# Patient Record
Sex: FEMALE | Race: WHITE | NOT HISPANIC OR LATINO
[De-identification: names, ages, dates, MRNs, and addresses within clinical notes are randomized per-mention and may not be internally consistent; named-entity substitution may affect disease eponyms.]

---

## 2017-02-13 ENCOUNTER — RESULT REVIEW (OUTPATIENT)
Age: 22
End: 2017-02-13

## 2017-02-14 ENCOUNTER — APPOINTMENT (OUTPATIENT)
Dept: SURGERY | Facility: CLINIC | Age: 22
End: 2017-02-14

## 2017-02-14 ENCOUNTER — LABORATORY RESULT (OUTPATIENT)
Age: 22
End: 2017-02-14

## 2017-02-14 VITALS
HEIGHT: 65 IN | BODY MASS INDEX: 46.32 KG/M2 | WEIGHT: 278 LBS | SYSTOLIC BLOOD PRESSURE: 144 MMHG | DIASTOLIC BLOOD PRESSURE: 86 MMHG | HEART RATE: 125 BPM

## 2017-02-17 ENCOUNTER — OTHER (OUTPATIENT)
Age: 22
End: 2017-02-17

## 2017-03-09 ENCOUNTER — FORM ENCOUNTER (OUTPATIENT)
Age: 22
End: 2017-03-09

## 2017-03-09 ENCOUNTER — RESULT REVIEW (OUTPATIENT)
Age: 22
End: 2017-03-09

## 2017-03-10 ENCOUNTER — OUTPATIENT (OUTPATIENT)
Dept: OUTPATIENT SERVICES | Facility: HOSPITAL | Age: 22
LOS: 1 days | End: 2017-03-10
Payer: COMMERCIAL

## 2017-03-10 ENCOUNTER — APPOINTMENT (OUTPATIENT)
Dept: ULTRASOUND IMAGING | Facility: IMAGING CENTER | Age: 22
End: 2017-03-10

## 2017-03-10 DIAGNOSIS — Z00.8 ENCOUNTER FOR OTHER GENERAL EXAMINATION: ICD-10-CM

## 2017-03-10 PROCEDURE — 88173 CYTOPATH EVAL FNA REPORT: CPT

## 2017-03-10 PROCEDURE — 76942 ECHO GUIDE FOR BIOPSY: CPT

## 2017-03-10 PROCEDURE — 88172 CYTP DX EVAL FNA 1ST EA SITE: CPT

## 2017-03-10 PROCEDURE — 10022: CPT

## 2017-03-13 LAB — NON-GYNECOLOGICAL CYTOLOGY STUDY: SIGNIFICANT CHANGE UP

## 2017-03-16 ENCOUNTER — OTHER (OUTPATIENT)
Age: 22
End: 2017-03-16

## 2017-04-04 ENCOUNTER — OTHER (OUTPATIENT)
Age: 22
End: 2017-04-04

## 2017-04-20 ENCOUNTER — OUTPATIENT (OUTPATIENT)
Dept: OUTPATIENT SERVICES | Facility: HOSPITAL | Age: 22
LOS: 1 days | End: 2017-04-20

## 2017-04-20 ENCOUNTER — APPOINTMENT (OUTPATIENT)
Dept: SURGERY | Facility: CLINIC | Age: 22
End: 2017-04-20

## 2017-04-20 VITALS
TEMPERATURE: 97 F | SYSTOLIC BLOOD PRESSURE: 140 MMHG | WEIGHT: 285.94 LBS | HEIGHT: 65 IN | RESPIRATION RATE: 14 BRPM | DIASTOLIC BLOOD PRESSURE: 76 MMHG | HEART RATE: 98 BPM

## 2017-04-20 DIAGNOSIS — Z01.818 ENCOUNTER FOR OTHER PREPROCEDURAL EXAMINATION: ICD-10-CM

## 2017-04-20 DIAGNOSIS — R20.0 ANESTHESIA OF SKIN: ICD-10-CM

## 2017-04-20 DIAGNOSIS — E04.1 NONTOXIC SINGLE THYROID NODULE: ICD-10-CM

## 2017-04-20 DIAGNOSIS — E66.01 MORBID (SEVERE) OBESITY DUE TO EXCESS CALORIES: ICD-10-CM

## 2017-04-20 LAB
HCT VFR BLD CALC: 44 % — SIGNIFICANT CHANGE UP (ref 34.5–45)
HGB BLD-MCNC: 14.2 G/DL — SIGNIFICANT CHANGE UP (ref 11.5–15.5)
MCHC RBC-ENTMCNC: 28.6 PG — SIGNIFICANT CHANGE UP (ref 27–34)
MCHC RBC-ENTMCNC: 32.3 % — SIGNIFICANT CHANGE UP (ref 32–36)
MCV RBC AUTO: 88.7 FL — SIGNIFICANT CHANGE UP (ref 80–100)
PLATELET # BLD AUTO: 279 K/UL — SIGNIFICANT CHANGE UP (ref 150–400)
PMV BLD: 10 FL — SIGNIFICANT CHANGE UP (ref 7–13)
RBC # BLD: 4.96 M/UL — SIGNIFICANT CHANGE UP (ref 3.8–5.2)
RBC # FLD: 13.3 % — SIGNIFICANT CHANGE UP (ref 10.3–14.5)
WBC # BLD: 9.7 K/UL — SIGNIFICANT CHANGE UP (ref 3.8–10.5)
WBC # FLD AUTO: 9.7 K/UL — SIGNIFICANT CHANGE UP (ref 3.8–10.5)

## 2017-04-20 RX ORDER — LEVOTHYROXINE SODIUM 125 MCG
1 TABLET ORAL
Qty: 0 | Refills: 0 | COMMUNITY

## 2017-04-20 RX ORDER — L.ACIDOPH/B.ANIMALIS/B.LONGUM 15B CELL
1 CAPSULE ORAL
Qty: 0 | Refills: 0 | COMMUNITY

## 2017-04-20 RX ORDER — LIOTHYRONINE SODIUM 25 UG/1
0 TABLET ORAL
Qty: 0 | Refills: 0 | COMMUNITY

## 2017-04-20 RX ORDER — CETIRIZINE HYDROCHLORIDE 10 MG/1
1 TABLET ORAL
Qty: 0 | Refills: 0 | COMMUNITY

## 2017-04-20 NOTE — H&P PST ADULT - RS GEN PE MLT RESP DETAILS PC
breath sounds equal/clear to auscultation bilaterally/good air movement/respirations non-labored/airway patent

## 2017-04-20 NOTE — H&P PST ADULT - NSANTHOSAYNRD_GEN_A_CORE
No. ELLIS screening performed.  STOP BANG Legend: 0-2 = LOW Risk; 3-4 = INTERMEDIATE Risk; 5-8 = HIGH Risk

## 2017-04-20 NOTE — H&P PST ADULT - NEGATIVE MUSCULOSKELETAL SYMPTOMS
no myalgia/no arthritis/no muscle cramps/no joint swelling/no neck pain/no muscle weakness/no back pain

## 2017-04-20 NOTE — H&P PST ADULT - ACTIVITY
walking, eliptical 15 min/daily, climbing stairs walking, elliptical 15 min/daily, climbs stairs without difficulty

## 2017-04-20 NOTE — H&P PST ADULT - HISTORY OF PRESENT ILLNESS
22 year old female with hx of hashimoto's thyroiditis, with recent sonogram revealing thyroid nodule. Pt had biopsy done which was suspicious...    Pt presents today for presurgical evalution for ... 22 year old female with hx of hashimoto's thyroiditis, with recent sonogram revealing thyroid nodule. Pt had biopsy done on 3/10/17 which was suspicious for malignancy.    Pt presents today for presurgical evaluation for Thyroidectomy scheduled on 5/1/17. 22 year old female with hx of hashimoto's thyroiditis, with recent sonogram revealing thyroid nodule. Pt had biopsy done on 3/10/17 which was suspicious for malignancy.    Pt presents today for presurgical evaluation for Total Thyroidectomy, Paratracheal Node Dissection scheduled on 5/1/17.

## 2017-04-20 NOTE — H&P PST ADULT - MUSCULOSKELETAL
details… detailed exam ROM intact/normal strength/no joint swelling/no joint erythema/no joint warmth/no calf tenderness

## 2017-04-20 NOTE — H&P PST ADULT - PROBLEM SELECTOR PLAN 1
Pt scheduled for ...  Pre-op instructions provided. Pt verbalized understanding.   Pepcid provided for GI prophylaxis.   Chlorhexidine wash provided and instructions given. Pt scheduled for Total Thyroidectomy, Paratracheal Node Dissection on 5/1/17.   Pre-op instructions provided. Pt verbalized understanding.   Pepcid provided for GI prophylaxis.   Chlorhexidine wash provided and instructions given.

## 2017-05-01 ENCOUNTER — RESULT REVIEW (OUTPATIENT)
Age: 22
End: 2017-05-01

## 2017-05-01 ENCOUNTER — TRANSCRIPTION ENCOUNTER (OUTPATIENT)
Age: 22
End: 2017-05-01

## 2017-05-01 ENCOUNTER — OTHER (OUTPATIENT)
Age: 22
End: 2017-05-01

## 2017-05-01 ENCOUNTER — APPOINTMENT (OUTPATIENT)
Dept: SURGERY | Facility: HOSPITAL | Age: 22
End: 2017-05-01

## 2017-05-01 ENCOUNTER — OUTPATIENT (OUTPATIENT)
Dept: OUTPATIENT SERVICES | Facility: HOSPITAL | Age: 22
LOS: 1 days | Discharge: HOME | End: 2017-05-01
Payer: COMMERCIAL

## 2017-05-01 VITALS
DIASTOLIC BLOOD PRESSURE: 77 MMHG | SYSTOLIC BLOOD PRESSURE: 162 MMHG | OXYGEN SATURATION: 94 % | HEART RATE: 107 BPM | RESPIRATION RATE: 18 BRPM

## 2017-05-01 VITALS
SYSTOLIC BLOOD PRESSURE: 148 MMHG | RESPIRATION RATE: 14 BRPM | HEART RATE: 106 BPM | DIASTOLIC BLOOD PRESSURE: 84 MMHG | WEIGHT: 285.06 LBS | OXYGEN SATURATION: 100 % | TEMPERATURE: 98 F | HEIGHT: 65 IN

## 2017-05-01 DIAGNOSIS — E04.1 NONTOXIC SINGLE THYROID NODULE: ICD-10-CM

## 2017-05-01 PROCEDURE — 88305 TISSUE EXAM BY PATHOLOGIST: CPT | Mod: 26

## 2017-05-01 PROCEDURE — 60252 REMOVAL OF THYROID: CPT

## 2017-05-01 PROCEDURE — 88307 TISSUE EXAM BY PATHOLOGIST: CPT | Mod: 26

## 2017-05-01 PROCEDURE — 13132 CMPLX RPR F/C/C/M/N/AX/G/H/F: CPT | Mod: 59

## 2017-05-01 RX ORDER — CALCIUM CARBONATE 500(1250)
1 TABLET ORAL
Qty: 0 | Refills: 0 | COMMUNITY
Start: 2017-05-01

## 2017-05-01 RX ORDER — SODIUM CHLORIDE 9 MG/ML
1000 INJECTION, SOLUTION INTRAVENOUS
Qty: 0 | Refills: 0 | Status: DISCONTINUED | OUTPATIENT
Start: 2017-05-01 | End: 2017-05-02

## 2017-05-01 RX ORDER — CALCIUM GLUCONATE 100 MG/ML
1 VIAL (ML) INTRAVENOUS ONCE
Qty: 0 | Refills: 0 | Status: COMPLETED | OUTPATIENT
Start: 2017-05-01 | End: 2017-05-01

## 2017-05-01 RX ORDER — ACETAMINOPHEN 500 MG
2 TABLET ORAL
Qty: 0 | Refills: 0 | COMMUNITY
Start: 2017-05-01

## 2017-05-01 RX ORDER — OXYCODONE HYDROCHLORIDE 5 MG/1
5 TABLET ORAL EVERY 4 HOURS
Qty: 0 | Refills: 0 | Status: DISCONTINUED | OUTPATIENT
Start: 2017-05-01 | End: 2017-05-02

## 2017-05-01 RX ORDER — SODIUM CHLORIDE 9 MG/ML
3 INJECTION INTRAMUSCULAR; INTRAVENOUS; SUBCUTANEOUS ONCE
Qty: 0 | Refills: 0 | Status: DISCONTINUED | OUTPATIENT
Start: 2017-05-01 | End: 2017-05-02

## 2017-05-01 RX ORDER — ONDANSETRON 8 MG/1
4 TABLET, FILM COATED ORAL ONCE
Qty: 0 | Refills: 0 | Status: DISCONTINUED | OUTPATIENT
Start: 2017-05-01 | End: 2017-05-02

## 2017-05-01 RX ORDER — FENTANYL CITRATE 50 UG/ML
50 INJECTION INTRAVENOUS
Qty: 0 | Refills: 0 | Status: DISCONTINUED | OUTPATIENT
Start: 2017-05-01 | End: 2017-05-02

## 2017-05-01 RX ORDER — OXYCODONE HYDROCHLORIDE 5 MG/1
10 TABLET ORAL EVERY 6 HOURS
Qty: 0 | Refills: 0 | Status: DISCONTINUED | OUTPATIENT
Start: 2017-05-01 | End: 2017-05-02

## 2017-05-01 RX ORDER — ACETAMINOPHEN 500 MG
650 TABLET ORAL EVERY 6 HOURS
Qty: 0 | Refills: 0 | Status: DISCONTINUED | OUTPATIENT
Start: 2017-05-01 | End: 2017-05-02

## 2017-05-01 RX ORDER — CALCIUM CARBONATE 500(1250)
1 TABLET ORAL
Qty: 0 | Refills: 0 | Status: DISCONTINUED | OUTPATIENT
Start: 2017-05-01 | End: 2017-05-02

## 2017-05-01 RX ORDER — ACETAMINOPHEN 500 MG
1000 TABLET ORAL ONCE
Qty: 0 | Refills: 0 | Status: COMPLETED | OUTPATIENT
Start: 2017-05-01 | End: 2017-05-01

## 2017-05-01 RX ORDER — ONDANSETRON 8 MG/1
8 TABLET, FILM COATED ORAL ONCE
Qty: 0 | Refills: 0 | Status: DISCONTINUED | OUTPATIENT
Start: 2017-05-01 | End: 2017-05-02

## 2017-05-01 RX ADMIN — Medication 1000 MILLIGRAM(S): at 14:25

## 2017-05-01 RX ADMIN — Medication 400 MILLIGRAM(S): at 14:10

## 2017-05-01 RX ADMIN — Medication 200 GRAM(S): at 09:30

## 2017-05-01 RX ADMIN — Medication 1 TABLET(S): at 12:00

## 2017-05-01 NOTE — ASU DISCHARGE PLAN (ADULT/PEDIATRIC). - NURSING INSTRUCTIONS
You were given 1000mg IV Tylenol for pain management.  Please DO NOT take any Tylenol containing products, such as  Vicodin, Percocet, Excedrin, many cold preparations for the next 8 hours (until 345p).  DO NOT EXCEED 3000MG OF TYLENOL OVER 24 HOURS.   Dr. Schaefer prefers that you avoid using NSAIDS.  Please DO Not take Motrin/Ibuprofen/Advil/Aleve (NSAIDS) You were given 1000mg IV Tylenol for pain management.  Please DO NOT take any Tylenol containing products, such as  Vicodin, Percocet, Excedrin, many cold preparations for the next 8 hours (until 10p).  DO NOT EXCEED 3000MG OF TYLENOL OVER 24 HOURS.   Dr. Schaefer prefers that you avoid using NSAIDS.  Please DO Not take Motrin/Ibuprofen/Advil/Aleve (NSAIDS)

## 2017-05-01 NOTE — ASU DISCHARGE PLAN (ADULT/PEDIATRIC). - MEDICATION SUMMARY - MEDICATIONS TO TAKE
I will START or STAY ON the medications listed below when I get home from the hospital:    acetaminophen 325 mg oral tablet  -- 2 tab(s) by mouth every 6 hours, As needed, Moderate Pain (4 - 6)  -- Indication: For Nontoxic uninodular goiter    calcium carbonate 500 mg (200 mg elemental calcium) oral tablet, chewable  -- 1 tab(s) by mouth 4 times a day  -- Indication: For Nontoxic uninodular goiter    ZyrTEC 10 mg oral tablet  -- 1 tab(s) by mouth once a day  -- Indication: For Nontoxic uninodular goiter    Probiotic Formula oral capsule  -- 1 cap(s) by mouth once a day  -- Indication: For Nontoxic uninodular goiter    Synthroid 112 mcg (0.112 mg) oral tablet  -- 1 tab(s) by mouth once a day (at bedtime)  -- Indication: For Nontoxic uninodular goiter    Cytomel 5 mcg oral tablet  --  by mouth once a day (at bedtime)  -- Indication: For Nontoxic uninodular goiter

## 2017-05-02 ENCOUNTER — APPOINTMENT (OUTPATIENT)
Dept: SURGERY | Facility: CLINIC | Age: 22
End: 2017-05-02

## 2017-05-02 RX ORDER — ONDANSETRON 4 MG/1
4 TABLET, ORALLY DISINTEGRATING ORAL
Qty: 30 | Refills: 0 | Status: COMPLETED | COMMUNITY
Start: 2016-08-26

## 2017-05-02 RX ORDER — LIOTHYRONINE SODIUM 5 UG/1
5 TABLET ORAL
Qty: 30 | Refills: 0 | Status: COMPLETED | COMMUNITY
Start: 2016-06-14

## 2017-05-02 RX ORDER — LEVOTHYROXINE SODIUM 0.11 MG/1
112 TABLET ORAL
Qty: 30 | Refills: 0 | Status: COMPLETED | COMMUNITY
Start: 2016-12-14

## 2017-05-02 RX ORDER — TRIAMCINOLONE ACETONIDE 1 MG/G
0.1 PASTE DENTAL
Qty: 5 | Refills: 0 | Status: COMPLETED | COMMUNITY
Start: 2016-11-11

## 2017-05-04 LAB — SURGICAL PATHOLOGY STUDY: SIGNIFICANT CHANGE UP

## 2017-06-05 ENCOUNTER — OUTPATIENT (OUTPATIENT)
Dept: OUTPATIENT SERVICES | Facility: HOSPITAL | Age: 22
LOS: 1 days | End: 2017-06-05

## 2017-06-05 LAB
BUN SERPL-MCNC: 9 MG/DL — SIGNIFICANT CHANGE UP (ref 7–23)
CA-I BLD-SCNC: 1.21 MMOL/L — SIGNIFICANT CHANGE UP (ref 1.03–1.23)
CALCIUM SERPL-MCNC: 9.3 MG/DL — SIGNIFICANT CHANGE UP (ref 8.4–10.5)
CHLORIDE SERPL-SCNC: 102 MMOL/L — SIGNIFICANT CHANGE UP (ref 98–107)
CO2 SERPL-SCNC: 28 MMOL/L — SIGNIFICANT CHANGE UP (ref 22–31)
CREAT SERPL-MCNC: 0.73 MG/DL — SIGNIFICANT CHANGE UP (ref 0.5–1.3)
GLUCOSE SERPL-MCNC: 69 MG/DL — LOW (ref 70–99)
HCT VFR BLD CALC: 40.8 % — SIGNIFICANT CHANGE UP (ref 34.5–45)
HGB BLD-MCNC: 13.3 G/DL — SIGNIFICANT CHANGE UP (ref 11.5–15.5)
MCHC RBC-ENTMCNC: 29 PG — SIGNIFICANT CHANGE UP (ref 27–34)
MCHC RBC-ENTMCNC: 32.6 % — SIGNIFICANT CHANGE UP (ref 32–36)
MCV RBC AUTO: 88.9 FL — SIGNIFICANT CHANGE UP (ref 80–100)
PLATELET # BLD AUTO: 296 K/UL — SIGNIFICANT CHANGE UP (ref 150–400)
PMV BLD: 10.1 FL — SIGNIFICANT CHANGE UP (ref 7–13)
POTASSIUM SERPL-MCNC: 6.7 MMOL/L — CRITICAL HIGH (ref 3.5–5.3)
POTASSIUM SERPL-SCNC: 6.7 MMOL/L — CRITICAL HIGH (ref 3.5–5.3)
RBC # BLD: 4.59 M/UL — SIGNIFICANT CHANGE UP (ref 3.8–5.2)
RBC # FLD: 13.3 % — SIGNIFICANT CHANGE UP (ref 10.3–14.5)
SODIUM SERPL-SCNC: 141 MMOL/L — SIGNIFICANT CHANGE UP (ref 135–145)
T3 SERPL-MCNC: 90.6 NG/DL — SIGNIFICANT CHANGE UP (ref 80–200)
T4 FREE SERPL-MCNC: 0.96 NG/DL — SIGNIFICANT CHANGE UP (ref 0.9–1.8)
TSH SERPL-MCNC: 13.53 UIU/ML — HIGH (ref 0.27–4.2)
WBC # BLD: 9.91 K/UL — SIGNIFICANT CHANGE UP (ref 3.8–10.5)
WBC # FLD AUTO: 9.91 K/UL — SIGNIFICANT CHANGE UP (ref 3.8–10.5)

## 2017-06-06 ENCOUNTER — OTHER (OUTPATIENT)
Age: 22
End: 2017-06-06

## 2017-06-06 LAB
THYROGLOB AB FLD IA-ACNC: <20 IU/ML — SIGNIFICANT CHANGE UP (ref 0–40)
THYROGLOB AB SERPL-ACNC: <20 IU/ML — SIGNIFICANT CHANGE UP (ref 0–40)
THYROGLOB SERPL-MCNC: 0.25 NG/ML — LOW (ref 1.6–59.9)

## 2017-06-07 LAB — T3FREE SERPL-MCNC: 2.44 PG/ML — SIGNIFICANT CHANGE UP (ref 1.8–4.6)

## 2017-06-13 ENCOUNTER — APPOINTMENT (OUTPATIENT)
Dept: SURGERY | Facility: CLINIC | Age: 22
End: 2017-06-13

## 2017-06-14 DIAGNOSIS — E04.1 NONTOXIC SINGLE THYROID NODULE: ICD-10-CM

## 2017-06-22 ENCOUNTER — APPOINTMENT (OUTPATIENT)
Dept: ENDOCRINOLOGY | Facility: CLINIC | Age: 22
End: 2017-06-22

## 2017-06-22 VITALS
BODY MASS INDEX: 46.32 KG/M2 | WEIGHT: 278 LBS | HEIGHT: 65 IN | OXYGEN SATURATION: 97 % | SYSTOLIC BLOOD PRESSURE: 120 MMHG | DIASTOLIC BLOOD PRESSURE: 82 MMHG | HEART RATE: 107 BPM

## 2017-06-22 DIAGNOSIS — F41.9 ANXIETY DISORDER, UNSPECIFIED: ICD-10-CM

## 2017-06-22 DIAGNOSIS — Z83.49 FAMILY HISTORY OF OTHER ENDOCRINE, NUTRITIONAL AND METABOLIC DISEASES: ICD-10-CM

## 2017-06-22 DIAGNOSIS — Z82.49 FAMILY HISTORY OF ISCHEMIC HEART DISEASE AND OTHER DISEASES OF THE CIRCULATORY SYSTEM: ICD-10-CM

## 2017-06-22 DIAGNOSIS — J30.2 OTHER SEASONAL ALLERGIC RHINITIS: ICD-10-CM

## 2017-06-22 RX ORDER — LIOTHYRONINE SODIUM 5 UG/1
5 TABLET ORAL DAILY
Refills: 0 | Status: ACTIVE | COMMUNITY

## 2017-06-22 RX ORDER — CLONAZEPAM 2 MG/1
TABLET ORAL
Refills: 0 | Status: ACTIVE | COMMUNITY

## 2017-06-22 RX ORDER — CHOLECALCIFEROL (VITAMIN D3) 50 MCG
50 MCG CAPSULE ORAL
Refills: 0 | Status: ACTIVE | COMMUNITY

## 2017-07-17 ENCOUNTER — APPOINTMENT (OUTPATIENT)
Dept: NUCLEAR MEDICINE | Facility: HOSPITAL | Age: 22
End: 2017-07-17

## 2017-07-17 ENCOUNTER — LABORATORY RESULT (OUTPATIENT)
Age: 22
End: 2017-07-17

## 2017-07-17 ENCOUNTER — OUTPATIENT (OUTPATIENT)
Dept: OUTPATIENT SERVICES | Facility: HOSPITAL | Age: 22
LOS: 1 days | End: 2017-07-17
Payer: COMMERCIAL

## 2017-07-17 DIAGNOSIS — C73 MALIGNANT NEOPLASM OF THYROID GLAND: ICD-10-CM

## 2017-07-17 LAB — HCG SERPL-ACNC: <2 MIU/ML — SIGNIFICANT CHANGE UP

## 2017-07-18 ENCOUNTER — MEDICATION RENEWAL (OUTPATIENT)
Age: 22
End: 2017-07-18

## 2017-07-18 ENCOUNTER — APPOINTMENT (OUTPATIENT)
Dept: NUCLEAR MEDICINE | Facility: HOSPITAL | Age: 22
End: 2017-07-18

## 2017-07-18 DIAGNOSIS — Z86.39 PERSONAL HISTORY OF OTHER ENDOCRINE, NUTRITIONAL AND METABOLIC DISEASE: ICD-10-CM

## 2017-07-18 LAB
25(OH)D3 SERPL-MCNC: 32.6 NG/ML
ALBUMIN SERPL ELPH-MCNC: 4.2 G/DL
ALP BLD-CCNC: 65 U/L
ALT SERPL-CCNC: 8 U/L
ANION GAP SERPL CALC-SCNC: 13 MMOL/L
AST SERPL-CCNC: 12 U/L
BILIRUB SERPL-MCNC: 0.2 MG/DL
BUN SERPL-MCNC: 11 MG/DL
CALCIUM SERPL-MCNC: 9 MG/DL
CALCIUM SERPL-MCNC: 9 MG/DL
CHLORIDE SERPL-SCNC: 104 MMOL/L
CO2 SERPL-SCNC: 24 MMOL/L
CREAT SERPL-MCNC: 0.8 MG/DL
GLUCOSE SERPL-MCNC: 93 MG/DL
PARATHYROID HORMONE INTACT: 37 PG/ML
POTASSIUM SERPL-SCNC: 4 MMOL/L
PROT SERPL-MCNC: 7.3 G/DL
SODIUM SERPL-SCNC: 141 MMOL/L
T3 SERPL-MCNC: 91 NG/DL
T3RU NFR SERPL: 1.02 INDEX
T4 SERPL-MCNC: 6.5 UG/DL
THYROGLOB AB SERPL-ACNC: <20 IU/ML
THYROGLOB SERPL-MCNC: <0.2 NG/ML
TSH SERPL-ACNC: 16.4 UIU/ML

## 2017-07-19 ENCOUNTER — APPOINTMENT (OUTPATIENT)
Dept: NUCLEAR MEDICINE | Facility: HOSPITAL | Age: 22
End: 2017-07-19

## 2017-07-20 ENCOUNTER — FORM ENCOUNTER (OUTPATIENT)
Age: 22
End: 2017-07-20

## 2017-07-21 ENCOUNTER — APPOINTMENT (OUTPATIENT)
Dept: NUCLEAR MEDICINE | Facility: HOSPITAL | Age: 22
End: 2017-07-21

## 2017-07-21 ENCOUNTER — OTHER (OUTPATIENT)
Age: 22
End: 2017-07-21

## 2017-07-21 PROCEDURE — 96372 THER/PROPH/DIAG INJ SC/IM: CPT

## 2017-07-21 PROCEDURE — A9528: CPT

## 2017-07-21 PROCEDURE — 78020 THYROID MET UPTAKE: CPT

## 2017-07-21 PROCEDURE — 84702 CHORIONIC GONADOTROPIN TEST: CPT

## 2017-07-21 PROCEDURE — 78018 THYROID MET IMAGING BODY: CPT

## 2017-07-21 PROCEDURE — 78999 UNLISTED MISC PX DX NUC MED: CPT

## 2017-08-28 ENCOUNTER — MEDICATION RENEWAL (OUTPATIENT)
Age: 22
End: 2017-08-28

## 2017-10-19 ENCOUNTER — APPOINTMENT (OUTPATIENT)
Dept: SURGERY | Facility: CLINIC | Age: 22
End: 2017-10-19
Payer: COMMERCIAL

## 2017-10-19 ENCOUNTER — LABORATORY RESULT (OUTPATIENT)
Age: 22
End: 2017-10-19

## 2017-10-19 PROCEDURE — 99213 OFFICE O/P EST LOW 20 MIN: CPT | Mod: 25

## 2017-10-19 PROCEDURE — 36415 COLL VENOUS BLD VENIPUNCTURE: CPT

## 2017-10-20 LAB
T3 SERPL-MCNC: 120 NG/DL
T4 FREE SERPL-MCNC: 1.8 NG/DL
THYROGLOB AB SERPL-ACNC: <20 IU/ML
THYROGLOB SERPL-MCNC: <0.2 NG/ML
TSH SERPL-ACNC: 0.46 UIU/ML

## 2017-10-23 ENCOUNTER — APPOINTMENT (OUTPATIENT)
Dept: ENDOCRINOLOGY | Facility: CLINIC | Age: 22
End: 2017-10-23
Payer: COMMERCIAL

## 2017-10-23 VITALS
OXYGEN SATURATION: 99 % | HEIGHT: 65 IN | SYSTOLIC BLOOD PRESSURE: 120 MMHG | HEART RATE: 122 BPM | WEIGHT: 274 LBS | DIASTOLIC BLOOD PRESSURE: 72 MMHG | BODY MASS INDEX: 45.65 KG/M2

## 2017-10-23 PROCEDURE — 99214 OFFICE O/P EST MOD 30 MIN: CPT

## 2018-02-19 ENCOUNTER — FORM ENCOUNTER (OUTPATIENT)
Age: 23
End: 2018-02-19

## 2018-02-20 ENCOUNTER — OUTPATIENT (OUTPATIENT)
Dept: OUTPATIENT SERVICES | Facility: HOSPITAL | Age: 23
LOS: 1 days | End: 2018-02-20
Payer: COMMERCIAL

## 2018-02-20 ENCOUNTER — APPOINTMENT (OUTPATIENT)
Dept: ULTRASOUND IMAGING | Facility: CLINIC | Age: 23
End: 2018-02-20
Payer: COMMERCIAL

## 2018-02-20 DIAGNOSIS — Z00.8 ENCOUNTER FOR OTHER GENERAL EXAMINATION: ICD-10-CM

## 2018-02-20 PROCEDURE — 76536 US EXAM OF HEAD AND NECK: CPT

## 2018-02-20 PROCEDURE — 76536 US EXAM OF HEAD AND NECK: CPT | Mod: 26

## 2018-02-21 ENCOUNTER — OTHER (OUTPATIENT)
Age: 23
End: 2018-02-21

## 2018-04-05 ENCOUNTER — APPOINTMENT (OUTPATIENT)
Dept: SURGERY | Facility: CLINIC | Age: 23
End: 2018-04-05
Payer: COMMERCIAL

## 2018-04-05 ENCOUNTER — LABORATORY RESULT (OUTPATIENT)
Age: 23
End: 2018-04-05

## 2018-04-05 PROCEDURE — 99213 OFFICE O/P EST LOW 20 MIN: CPT

## 2018-04-05 PROCEDURE — 36415 COLL VENOUS BLD VENIPUNCTURE: CPT

## 2018-04-06 LAB
T3 SERPL-MCNC: 103 NG/DL
T4 FREE SERPL-MCNC: 1.7 NG/DL
THYROGLOB AB SERPL-ACNC: <20 IU/ML
THYROGLOB SERPL-MCNC: <0.2 NG/ML
TSH SERPL-ACNC: 1.07 UIU/ML

## 2018-04-10 ENCOUNTER — OTHER (OUTPATIENT)
Age: 23
End: 2018-04-10

## 2018-04-17 ENCOUNTER — APPOINTMENT (OUTPATIENT)
Dept: ENDOCRINOLOGY | Facility: CLINIC | Age: 23
End: 2018-04-17
Payer: COMMERCIAL

## 2018-04-17 VITALS
DIASTOLIC BLOOD PRESSURE: 80 MMHG | HEART RATE: 96 BPM | OXYGEN SATURATION: 98 % | BODY MASS INDEX: 45.98 KG/M2 | WEIGHT: 276 LBS | HEIGHT: 65 IN | SYSTOLIC BLOOD PRESSURE: 122 MMHG

## 2018-04-17 PROCEDURE — 99214 OFFICE O/P EST MOD 30 MIN: CPT

## 2018-06-25 ENCOUNTER — APPOINTMENT (OUTPATIENT)
Dept: NUCLEAR MEDICINE | Facility: HOSPITAL | Age: 23
End: 2018-06-25
Payer: COMMERCIAL

## 2018-06-25 ENCOUNTER — OUTPATIENT (OUTPATIENT)
Dept: OUTPATIENT SERVICES | Facility: HOSPITAL | Age: 23
LOS: 1 days | End: 2018-06-25
Payer: COMMERCIAL

## 2018-06-25 DIAGNOSIS — C73 MALIGNANT NEOPLASM OF THYROID GLAND: ICD-10-CM

## 2018-06-25 LAB — HCG SERPL-ACNC: <2 MIU/ML — SIGNIFICANT CHANGE UP

## 2018-06-25 PROCEDURE — 99213 OFFICE O/P EST LOW 20 MIN: CPT

## 2018-06-26 ENCOUNTER — APPOINTMENT (OUTPATIENT)
Dept: NUCLEAR MEDICINE | Facility: HOSPITAL | Age: 23
End: 2018-06-26

## 2018-06-27 ENCOUNTER — APPOINTMENT (OUTPATIENT)
Dept: NUCLEAR MEDICINE | Facility: HOSPITAL | Age: 23
End: 2018-06-27

## 2018-06-28 ENCOUNTER — FORM ENCOUNTER (OUTPATIENT)
Age: 23
End: 2018-06-28

## 2018-06-29 ENCOUNTER — APPOINTMENT (OUTPATIENT)
Dept: NUCLEAR MEDICINE | Facility: HOSPITAL | Age: 23
End: 2018-06-29

## 2018-06-29 PROCEDURE — 84702 CHORIONIC GONADOTROPIN TEST: CPT

## 2018-06-29 PROCEDURE — 78018 THYROID MET IMAGING BODY: CPT

## 2018-06-29 PROCEDURE — 96372 THER/PROPH/DIAG INJ SC/IM: CPT

## 2018-06-29 PROCEDURE — 78020 THYROID MET UPTAKE: CPT | Mod: 26

## 2018-06-29 PROCEDURE — 78020 THYROID MET UPTAKE: CPT

## 2018-06-29 PROCEDURE — A9528: CPT

## 2018-06-29 PROCEDURE — 78018 THYROID MET IMAGING BODY: CPT | Mod: 26

## 2018-06-29 PROCEDURE — 78999 UNLISTED MISC PX DX NUC MED: CPT

## 2018-06-29 PROCEDURE — 78803 RP LOCLZJ TUM SPECT 1 AREA: CPT | Mod: 26

## 2018-07-03 ENCOUNTER — OTHER (OUTPATIENT)
Age: 23
End: 2018-07-03

## 2018-07-22 PROBLEM — J30.2 SEASONAL ALLERGIES: Status: ACTIVE | Noted: 2017-06-22

## 2018-09-04 ENCOUNTER — RX RENEWAL (OUTPATIENT)
Age: 23
End: 2018-09-04

## 2018-09-04 RX ORDER — LEVOTHYROXINE SODIUM 0.2 MG/1
200 TABLET ORAL DAILY
Qty: 90 | Refills: 3 | Status: ACTIVE | COMMUNITY
Start: 2018-09-04 | End: 1900-01-01

## 2018-10-04 ENCOUNTER — APPOINTMENT (OUTPATIENT)
Dept: SURGERY | Facility: CLINIC | Age: 23
End: 2018-10-04
Payer: COMMERCIAL

## 2018-10-04 ENCOUNTER — LABORATORY RESULT (OUTPATIENT)
Age: 23
End: 2018-10-04

## 2018-10-04 PROCEDURE — 99213 OFFICE O/P EST LOW 20 MIN: CPT

## 2018-10-04 PROCEDURE — 36415 COLL VENOUS BLD VENIPUNCTURE: CPT

## 2018-10-05 PROBLEM — J30.2 OTHER SEASONAL ALLERGIC RHINITIS: Chronic | Status: ACTIVE | Noted: 2017-04-20

## 2018-10-05 PROBLEM — E06.3 AUTOIMMUNE THYROIDITIS: Chronic | Status: ACTIVE | Noted: 2017-04-20

## 2018-10-05 PROBLEM — E66.01 MORBID (SEVERE) OBESITY DUE TO EXCESS CALORIES: Chronic | Status: ACTIVE | Noted: 2017-04-20

## 2018-10-06 LAB
T3 SERPL-MCNC: 104 NG/DL
T4 FREE SERPL-MCNC: 1.5 NG/DL
THYROGLOB AB SERPL-ACNC: <20 IU/ML
THYROGLOB SERPL-MCNC: <0.2 NG/ML
TSH SERPL-ACNC: 0.2 UIU/ML

## 2018-10-08 ENCOUNTER — APPOINTMENT (OUTPATIENT)
Dept: ENDOCRINOLOGY | Facility: CLINIC | Age: 23
End: 2018-10-08

## 2018-10-09 ENCOUNTER — RESULT REVIEW (OUTPATIENT)
Age: 23
End: 2018-10-09

## 2019-02-15 ENCOUNTER — FORM ENCOUNTER (OUTPATIENT)
Age: 24
End: 2019-02-15

## 2019-02-16 ENCOUNTER — OUTPATIENT (OUTPATIENT)
Dept: OUTPATIENT SERVICES | Facility: HOSPITAL | Age: 24
LOS: 1 days | End: 2019-02-16
Payer: COMMERCIAL

## 2019-02-16 ENCOUNTER — APPOINTMENT (OUTPATIENT)
Dept: ULTRASOUND IMAGING | Facility: CLINIC | Age: 24
End: 2019-02-16

## 2019-02-16 DIAGNOSIS — Z00.8 ENCOUNTER FOR OTHER GENERAL EXAMINATION: ICD-10-CM

## 2019-02-16 PROCEDURE — 76536 US EXAM OF HEAD AND NECK: CPT | Mod: 26

## 2019-02-16 PROCEDURE — 76536 US EXAM OF HEAD AND NECK: CPT

## 2019-02-19 ENCOUNTER — APPOINTMENT (OUTPATIENT)
Dept: SURGERY | Facility: CLINIC | Age: 24
End: 2019-02-19
Payer: COMMERCIAL

## 2019-02-19 ENCOUNTER — LABORATORY RESULT (OUTPATIENT)
Age: 24
End: 2019-02-19

## 2019-02-19 LAB
T3 SERPL-MCNC: 113 NG/DL
T4 FREE SERPL-MCNC: 1.9 NG/DL
TSH SERPL-ACNC: 0.08 UIU/ML

## 2019-02-19 PROCEDURE — 36415 COLL VENOUS BLD VENIPUNCTURE: CPT

## 2019-02-19 PROCEDURE — 99213 OFFICE O/P EST LOW 20 MIN: CPT | Mod: 25

## 2019-02-19 PROCEDURE — 20550 NJX 1 TENDON SHEATH/LIGAMENT: CPT | Mod: 59

## 2019-02-19 NOTE — HISTORY OF PRESENT ILLNESS
[de-identified] : /2 years s/p total thyroidectomy for malignancy with no postop MUNIZ. denies dysphagia, hoarseness or new lesions. feels well on Synthroid 200 mcg and Cytomel 5 mcg daily. no changes medically since last visit. notes slight thickening of scar.  recent  sonogram  HAROON

## 2019-02-19 NOTE — PHYSICAL EXAM
[de-identified] : slight thickening of scar left side and right end [de-identified] : no palpable thyroid bed nodules [Laryngoscopy Performed] : laryngoscopy was performed, see procedure section for findings [Midline] : located in midline position [de-identified] : mild, symmetrical tonsillar enlargement [Normal] : orientation to person, place, and time: normal

## 2019-02-19 NOTE — ASSESSMENT
[FreeTextEntry1] : will observe.  bloods drawn. to call  next week for results.    to return earlier if any change.scar injected with 1 cc Kenalog 10. tolerated well

## 2019-02-21 ENCOUNTER — RESULT REVIEW (OUTPATIENT)
Age: 24
End: 2019-02-21

## 2019-02-21 LAB
THYROGLOB AB SERPL-ACNC: <20 IU/ML
THYROGLOB SERPL-MCNC: <0.2 NG/ML

## 2019-04-23 ENCOUNTER — APPOINTMENT (OUTPATIENT)
Dept: SURGERY | Facility: CLINIC | Age: 24
End: 2019-04-23

## 2019-05-02 ENCOUNTER — APPOINTMENT (OUTPATIENT)
Dept: SURGERY | Facility: CLINIC | Age: 24
End: 2019-05-02
Payer: COMMERCIAL

## 2019-05-02 PROCEDURE — 20550 NJX 1 TENDON SHEATH/LIGAMENT: CPT | Mod: 59

## 2019-05-02 PROCEDURE — 99212 OFFICE O/P EST SF 10 MIN: CPT | Mod: 25

## 2019-05-02 RX ORDER — ONDANSETRON 4 MG/1
4 TABLET ORAL
Qty: 60 | Refills: 0 | Status: ACTIVE | COMMUNITY
Start: 2018-12-30

## 2019-05-02 RX ORDER — AZITHROMYCIN 250 MG/1
250 TABLET, FILM COATED ORAL
Qty: 28 | Refills: 0 | Status: ACTIVE | COMMUNITY
Start: 2019-04-18

## 2019-05-02 RX ORDER — CLONAZEPAM 0.5 MG/1
0.5 TABLET ORAL
Qty: 90 | Refills: 0 | Status: ACTIVE | COMMUNITY
Start: 2019-03-30

## 2019-05-02 NOTE — HISTORY OF PRESENT ILLNESS
[de-identified] : 2 years s/p total thyroidectomy for malignancy with no postop MUNIZ. denies dysphagia, hoarseness or new lesions. feels well on Synthroid 200 mcg and Cytomel 5 mcg daily. no changes medically since last visit. notes slight thickening of scar, softer since last steroid injection.  last  sonogram  HAROON

## 2019-05-02 NOTE — ASSESSMENT
[FreeTextEntry1] : will observe.     to return earlier if any change.  scar injected with 1 cc Kenalog 10. tolerated well

## 2019-05-02 NOTE — PHYSICAL EXAM
[de-identified] : slight thickening of scar left side and right end - softer than previous [de-identified] : no palpable thyroid bed nodules [Midline] : located in midline position [Normal] : orientation to person, place, and time: normal

## 2019-08-14 ENCOUNTER — RX RENEWAL (OUTPATIENT)
Age: 24
End: 2019-08-14

## 2019-12-04 ENCOUNTER — OTHER (OUTPATIENT)
Age: 24
End: 2019-12-04

## 2019-12-17 ENCOUNTER — APPOINTMENT (OUTPATIENT)
Dept: SURGERY | Facility: CLINIC | Age: 24
End: 2019-12-17
Payer: COMMERCIAL

## 2019-12-17 PROCEDURE — 36415 COLL VENOUS BLD VENIPUNCTURE: CPT

## 2019-12-17 PROCEDURE — 99213 OFFICE O/P EST LOW 20 MIN: CPT

## 2019-12-17 NOTE — ASSESSMENT
[FreeTextEntry1] : will observe.     to return earlier if any change.  bloods drawn. sonogram pending. to call next week for results.

## 2019-12-17 NOTE — HISTORY OF PRESENT ILLNESS
[de-identified] : 2 1/2 years s/p total thyroidectomy for malignancy with no postop MUNIZ. denies dysphagia, hoarseness or new lesions. feels well on Synthroid 200 mcg and Cytomel 5 mcg daily. no changes medically since last visit.   last  sonogram  HAROON

## 2019-12-17 NOTE — PHYSICAL EXAM
[de-identified] : slight thickening of scar left side and right end - softer than previous [de-identified] : no palpable thyroid bed nodules [Laryngoscopy Performed] : laryngoscopy was performed, see procedure section for findings [Midline] : located in midline position [Normal] : orientation to person, place, and time: normal

## 2019-12-18 ENCOUNTER — LABORATORY RESULT (OUTPATIENT)
Age: 24
End: 2019-12-18

## 2019-12-19 LAB
T3 SERPL-MCNC: 122 NG/DL
T4 FREE SERPL-MCNC: 1.8 NG/DL
THYROGLOB AB SERPL-ACNC: <20 IU/ML
THYROGLOB SERPL-MCNC: <0.2 NG/ML
TSH SERPL-ACNC: 0.03 UIU/ML

## 2019-12-26 ENCOUNTER — FORM ENCOUNTER (OUTPATIENT)
Age: 24
End: 2019-12-26

## 2019-12-27 ENCOUNTER — APPOINTMENT (OUTPATIENT)
Dept: ULTRASOUND IMAGING | Facility: CLINIC | Age: 24
End: 2019-12-27
Payer: COMMERCIAL

## 2019-12-27 ENCOUNTER — OUTPATIENT (OUTPATIENT)
Dept: OUTPATIENT SERVICES | Facility: HOSPITAL | Age: 24
LOS: 1 days | End: 2019-12-27
Payer: COMMERCIAL

## 2019-12-27 DIAGNOSIS — C73 MALIGNANT NEOPLASM OF THYROID GLAND: ICD-10-CM

## 2019-12-27 PROCEDURE — 76536 US EXAM OF HEAD AND NECK: CPT | Mod: 26

## 2019-12-27 PROCEDURE — 76536 US EXAM OF HEAD AND NECK: CPT

## 2019-12-30 ENCOUNTER — OTHER (OUTPATIENT)
Age: 24
End: 2019-12-30

## 2020-12-15 ENCOUNTER — LABORATORY RESULT (OUTPATIENT)
Age: 25
End: 2020-12-15

## 2020-12-15 ENCOUNTER — APPOINTMENT (OUTPATIENT)
Dept: SURGERY | Facility: CLINIC | Age: 25
End: 2020-12-15
Payer: COMMERCIAL

## 2020-12-15 PROCEDURE — 36415 COLL VENOUS BLD VENIPUNCTURE: CPT

## 2020-12-15 PROCEDURE — 99213 OFFICE O/P EST LOW 20 MIN: CPT

## 2020-12-15 PROCEDURE — 99072 ADDL SUPL MATRL&STAF TM PHE: CPT

## 2020-12-15 RX ORDER — ZOLMITRIPTAN 5 MG/1
5 SPRAY, METERED NASAL
Qty: 12 | Refills: 0 | Status: ACTIVE | COMMUNITY
Start: 2020-12-01

## 2020-12-15 NOTE — HISTORY OF PRESENT ILLNESS
[de-identified] : 3 1/2 years s/p total thyroidectomy for malignancy with no postop MUNIZ. denies dysphagia, hoarseness or new lesions. feels well on Synthroid 200 mcg and Cytomel 5 mcg daily. no changes medically since last visit.   last  sonogram  HAROON

## 2020-12-15 NOTE — PHYSICAL EXAM
[de-identified] : slight thickening of scar left side and right end - softer than previous [de-identified] : no palpable thyroid bed nodules [Laryngoscopy Performed] : laryngoscopy was performed, see procedure section for findings [Midline] : located in midline position [Normal] : orientation to person, place, and time: normal

## 2020-12-15 NOTE — ASSESSMENT
[FreeTextEntry1] : will observe.     to return earlier if any change.  bloods drawn. sonogram at 5 years unless TG rises. to call next week for results.

## 2020-12-16 LAB
T3 SERPL-MCNC: 102 NG/DL
T4 FREE SERPL-MCNC: 1.6 NG/DL
THYROGLOB AB SERPL-ACNC: <20 IU/ML
THYROGLOB SERPL-MCNC: <0.2 NG/ML
TSH SERPL-ACNC: 0.05 UIU/ML

## 2020-12-17 ENCOUNTER — NON-APPOINTMENT (OUTPATIENT)
Age: 25
End: 2020-12-17

## 2021-06-22 ENCOUNTER — LABORATORY RESULT (OUTPATIENT)
Age: 26
End: 2021-06-22

## 2021-06-22 ENCOUNTER — APPOINTMENT (OUTPATIENT)
Dept: SURGERY | Facility: CLINIC | Age: 26
End: 2021-06-22
Payer: COMMERCIAL

## 2021-06-22 PROCEDURE — 36415 COLL VENOUS BLD VENIPUNCTURE: CPT

## 2021-06-22 PROCEDURE — 99072 ADDL SUPL MATRL&STAF TM PHE: CPT

## 2021-06-22 PROCEDURE — 99214 OFFICE O/P EST MOD 30 MIN: CPT | Mod: 25

## 2021-06-22 NOTE — ASSESSMENT
[FreeTextEntry1] : will observe.     to return earlier if any change.  bloods drawn. sonogram next visit.  to see dermatologist regarding possible laser to lighten scar. to call next week for results.  patient has been given the opportunity to ask questions, and all of the patient's questions have been answered to their satisfaction\par

## 2021-06-22 NOTE — PHYSICAL EXAM
[de-identified] : slight redness of scar left side  [de-identified] : no palpable thyroid bed nodules [Laryngoscopy Performed] : laryngoscopy was performed, see procedure section for findings [Midline] : located in midline position [Normal] : orientation to person, place, and time: normal

## 2021-06-22 NOTE — HISTORY OF PRESENT ILLNESS
[de-identified] : 4 years s/p total thyroidectomy for malignancy with no postop MUNIZ. denies dysphagia, hoarseness or new lesions. feels well on Synthroid 200 mcg and Cytomel 5 mcg daily. no changes medically since last visit.   last  sonogram  HAROON.  I have reviewed all old and new data and available images.   Additional information was obtained from others present at the time of visit to ensure the completeness of the history\par

## 2021-06-24 LAB
T3 SERPL-MCNC: 111 NG/DL
T4 FREE SERPL-MCNC: 1.8 NG/DL
THYROGLOB AB SERPL-ACNC: <20 IU/ML
THYROGLOB SERPL-MCNC: <0.2 NG/ML
TSH SERPL-ACNC: 0.04 UIU/ML

## 2021-06-28 ENCOUNTER — NON-APPOINTMENT (OUTPATIENT)
Age: 26
End: 2021-06-28

## 2021-07-06 ENCOUNTER — TRANSCRIPTION ENCOUNTER (OUTPATIENT)
Age: 26
End: 2021-07-06

## 2022-03-31 ENCOUNTER — APPOINTMENT (OUTPATIENT)
Dept: GASTROENTEROLOGY | Facility: CLINIC | Age: 27
End: 2022-03-31
Payer: COMMERCIAL

## 2022-03-31 ENCOUNTER — NON-APPOINTMENT (OUTPATIENT)
Age: 27
End: 2022-03-31

## 2022-03-31 DIAGNOSIS — K58.0 IRRITABLE BOWEL SYNDROME WITH DIARRHEA: ICD-10-CM

## 2022-03-31 DIAGNOSIS — R10.9 UNSPECIFIED ABDOMINAL PAIN: ICD-10-CM

## 2022-03-31 PROCEDURE — 99202 OFFICE O/P NEW SF 15 MIN: CPT | Mod: 95

## 2022-06-01 ENCOUNTER — NON-APPOINTMENT (OUTPATIENT)
Age: 27
End: 2022-06-01

## 2022-06-15 ENCOUNTER — NON-APPOINTMENT (OUTPATIENT)
Age: 27
End: 2022-06-15

## 2022-06-22 ENCOUNTER — NON-APPOINTMENT (OUTPATIENT)
Age: 27
End: 2022-06-22

## 2022-08-13 ENCOUNTER — APPOINTMENT (OUTPATIENT)
Dept: ULTRASOUND IMAGING | Facility: CLINIC | Age: 27
End: 2022-08-13

## 2022-08-13 ENCOUNTER — OUTPATIENT (OUTPATIENT)
Dept: OUTPATIENT SERVICES | Facility: HOSPITAL | Age: 27
LOS: 1 days | End: 2022-08-13
Payer: COMMERCIAL

## 2022-08-13 DIAGNOSIS — Z00.8 ENCOUNTER FOR OTHER GENERAL EXAMINATION: ICD-10-CM

## 2022-08-13 DIAGNOSIS — C73 MALIGNANT NEOPLASM OF THYROID GLAND: ICD-10-CM

## 2022-08-13 PROCEDURE — 76536 US EXAM OF HEAD AND NECK: CPT | Mod: 26

## 2022-08-13 PROCEDURE — 76536 US EXAM OF HEAD AND NECK: CPT

## 2022-08-16 ENCOUNTER — LABORATORY RESULT (OUTPATIENT)
Age: 27
End: 2022-08-16

## 2022-08-16 ENCOUNTER — APPOINTMENT (OUTPATIENT)
Dept: SURGERY | Facility: CLINIC | Age: 27
End: 2022-08-16

## 2022-08-16 DIAGNOSIS — Z00.00 ENCOUNTER FOR GENERAL ADULT MEDICAL EXAMINATION W/OUT ABNORMAL FINDINGS: ICD-10-CM

## 2022-08-16 PROCEDURE — 36415 COLL VENOUS BLD VENIPUNCTURE: CPT

## 2022-08-16 PROCEDURE — 99214 OFFICE O/P EST MOD 30 MIN: CPT | Mod: 25

## 2022-08-16 RX ORDER — GUAIFENESIN AND CODEINE PHOSPHATE 10; 100 MG/5ML; MG/5ML
100-10 SOLUTION ORAL
Qty: 210 | Refills: 0 | Status: ACTIVE | COMMUNITY
Start: 2022-06-13

## 2022-08-16 RX ORDER — BENZONATATE 100 MG/1
100 CAPSULE ORAL
Qty: 42 | Refills: 0 | Status: ACTIVE | COMMUNITY
Start: 2022-06-16

## 2022-08-16 RX ORDER — NIRMATRELVIR AND RITONAVIR 300-100 MG
20 X 150 MG & KIT ORAL
Qty: 30 | Refills: 0 | Status: ACTIVE | COMMUNITY
Start: 2022-07-06

## 2022-08-16 NOTE — PHYSICAL EXAM
[de-identified] : well healed scar [de-identified] : no palpable thyroid bed nodules [Laryngoscopy Performed] : laryngoscopy was performed, see procedure section for findings [Midline] : located in midline position [Normal] : orientation to person, place, and time: normal

## 2022-08-16 NOTE — HISTORY OF PRESENT ILLNESS
[de-identified] : 5  years s/p total thyroidectomy for malignancy with no postop MUNIZ. denies dysphagia, hoarseness or new lesions. feels well on Synthroid 200 mcg and Cytomel 5 mcg daily. no changes medically since last visit.   recent  sonogram  HAROON.  I have reviewed all old and new data and available images.   Additional information was obtained from others present at the time of visit to ensure the completeness of the history\par

## 2022-08-16 NOTE — ASSESSMENT
[FreeTextEntry1] : will observe.     to return earlier if any change.  bloods drawn. . to call next week for results.  patient has been given the opportunity to ask questions, and all of the patient's questions have been answered to their satisfaction\par

## 2022-08-17 LAB
24R-OH-CALCIDIOL SERPL-MCNC: 39.1 PG/ML
CALCIUM SERPL-MCNC: 10 MG/DL
CALCIUM SERPL-MCNC: 10 MG/DL
PARATHYROID HORMONE INTACT: 31 PG/ML
T3 SERPL-MCNC: 114 NG/DL
T4 FREE SERPL-MCNC: 1.7 NG/DL
THYROGLOB AB SERPL-ACNC: <20 IU/ML
THYROGLOB SERPL-MCNC: <0.2 NG/ML
TSH SERPL-ACNC: 0.02 UIU/ML

## 2022-08-18 ENCOUNTER — NON-APPOINTMENT (OUTPATIENT)
Age: 27
End: 2022-08-18

## 2022-11-27 ENCOUNTER — OUTPATIENT (OUTPATIENT)
Dept: OUTPATIENT SERVICES | Facility: HOSPITAL | Age: 27
LOS: 1 days | End: 2022-11-27
Payer: COMMERCIAL

## 2022-11-27 ENCOUNTER — APPOINTMENT (OUTPATIENT)
Dept: MRI IMAGING | Facility: CLINIC | Age: 27
End: 2022-11-27

## 2022-11-27 DIAGNOSIS — Z00.8 ENCOUNTER FOR OTHER GENERAL EXAMINATION: ICD-10-CM

## 2022-11-27 PROCEDURE — 73718 MRI LOWER EXTREMITY W/O DYE: CPT | Mod: 26,LT

## 2022-11-27 PROCEDURE — 73718 MRI LOWER EXTREMITY W/O DYE: CPT

## 2023-02-16 ENCOUNTER — NON-APPOINTMENT (OUTPATIENT)
Age: 28
End: 2023-02-16

## 2023-02-21 ENCOUNTER — APPOINTMENT (OUTPATIENT)
Dept: ENDOCRINOLOGY | Facility: CLINIC | Age: 28
End: 2023-02-21
Payer: COMMERCIAL

## 2023-02-21 VITALS
WEIGHT: 277 LBS | DIASTOLIC BLOOD PRESSURE: 80 MMHG | OXYGEN SATURATION: 98 % | HEIGHT: 65 IN | BODY MASS INDEX: 46.15 KG/M2 | SYSTOLIC BLOOD PRESSURE: 130 MMHG | HEART RATE: 117 BPM | TEMPERATURE: 98.6 F

## 2023-02-21 DIAGNOSIS — G43.909 MIGRAINE, UNSPECIFIED, NOT INTRACTABLE, W/OUT STATUS MIGRAINOSUS: ICD-10-CM

## 2023-02-21 PROCEDURE — 36415 COLL VENOUS BLD VENIPUNCTURE: CPT

## 2023-02-21 PROCEDURE — 99204 OFFICE O/P NEW MOD 45 MIN: CPT | Mod: 25

## 2023-02-22 ENCOUNTER — NON-APPOINTMENT (OUTPATIENT)
Age: 28
End: 2023-02-22

## 2023-02-22 PROBLEM — G43.909 MIGRAINE: Status: ACTIVE | Noted: 2017-06-22

## 2023-02-22 LAB
25(OH)D3 SERPL-MCNC: 39.6 NG/ML
ALBUMIN SERPL ELPH-MCNC: 4.5 G/DL
ALP BLD-CCNC: 77 U/L
ALT SERPL-CCNC: 14 U/L
ANION GAP SERPL CALC-SCNC: 15 MMOL/L
AST SERPL-CCNC: 16 U/L
BASOPHILS # BLD AUTO: 0.04 K/UL
BASOPHILS NFR BLD AUTO: 0.6 %
BILIRUB SERPL-MCNC: 0.2 MG/DL
BUN SERPL-MCNC: 8 MG/DL
CALCIUM SERPL-MCNC: 9.6 MG/DL
CHLORIDE SERPL-SCNC: 104 MMOL/L
CO2 SERPL-SCNC: 21 MMOL/L
CREAT SERPL-MCNC: 0.57 MG/DL
DHEA-S SERPL-MCNC: 265 UG/DL
EGFR: 127 ML/MIN/1.73M2
EOSINOPHIL # BLD AUTO: 0.01 K/UL
EOSINOPHIL NFR BLD AUTO: 0.1 %
ESTIMATED AVERAGE GLUCOSE: 105 MG/DL
ESTRADIOL SERPL-MCNC: 40 PG/ML
FERRITIN SERPL-MCNC: 23 NG/ML
FOLATE SERPL-MCNC: 6.9 NG/ML
FSH SERPL-MCNC: 2.6 IU/L
GLUCOSE SERPL-MCNC: 103 MG/DL
HBA1C MFR BLD HPLC: 5.3 %
HCT VFR BLD CALC: 43.8 %
HGB BLD-MCNC: 14.1 G/DL
IMM GRANULOCYTES NFR BLD AUTO: 0.4 %
IRON SERPL-MCNC: 53 UG/DL
LH SERPL-ACNC: 2.8 IU/L
LYMPHOCYTES # BLD AUTO: 1.34 K/UL
LYMPHOCYTES NFR BLD AUTO: 18.8 %
MAGNESIUM SERPL-MCNC: 1.9 MG/DL
MAN DIFF?: NORMAL
MCHC RBC-ENTMCNC: 27.8 PG
MCHC RBC-ENTMCNC: 32.2 GM/DL
MCV RBC AUTO: 86.4 FL
MONOCYTES # BLD AUTO: 0.36 K/UL
MONOCYTES NFR BLD AUTO: 5.1 %
NEUTROPHILS # BLD AUTO: 5.34 K/UL
NEUTROPHILS NFR BLD AUTO: 75 %
PLATELET # BLD AUTO: 329 K/UL
POTASSIUM SERPL-SCNC: 4.7 MMOL/L
PROT SERPL-MCNC: 7.6 G/DL
RBC # BLD: 5.07 M/UL
RBC # FLD: 13.3 %
SODIUM SERPL-SCNC: 140 MMOL/L
T3FREE SERPL-MCNC: 2.98 PG/ML
T4 FREE SERPL-MCNC: 1.6 NG/DL
TSH SERPL-ACNC: 0.04 UIU/ML
VIT B12 SERPL-MCNC: 1053 PG/ML
WBC # FLD AUTO: 7.12 K/UL

## 2023-02-22 NOTE — HISTORY OF PRESENT ILLNESS
[FreeTextEntry1] : Ms. WARREN  is a 27 year old  female who presents for initial endocrine evaluation. She presents with regard to a history of papillary thyroid cancer and is now s/p total thyroidectomy in May 2017 with no MUNIZ needed . Previously was following with Dr. Sandra Schmidt. \par \par Last thyroid US August 2022 unremarkable. \par labs august 2022 TGB <0.2 with TSH 0.02 \par \par She is currently taking Synthroid 200 mcg and cytomel 5 mcg QD. \par \par She notes that after thyroidectomy in 2017, she lost 50 lbs. She notes that since she has struggled to lose any furtherweight despite consistent physical activity. Walks about 1 mile daily and too exercising using the elliptical on the weekends. Diet is reasonable, not overloaded in carbs. Does not eat junk food. \par \par She states that her weight was in the 200s as a teenager. Current weight: 277lbs\par \par Menses are regular\par \par Additional medical history includes that of obesity, migraines \par Meds: Lunesta\par \par Taking magnesium for migraines which she has noted significant improvement.\par \par SHx: Is a  \par

## 2023-02-23 LAB
THYROGLOB AB SERPL-ACNC: <20 IU/ML
THYROPEROXIDASE AB SERPL IA-ACNC: 15.5 IU/ML

## 2023-03-03 LAB
TESTOST FREE SERPL-MCNC: 0.7 PG/ML
TESTOST SERPL-MCNC: 19.3 NG/DL

## 2023-04-18 ENCOUNTER — NON-APPOINTMENT (OUTPATIENT)
Age: 28
End: 2023-04-18

## 2023-05-22 ENCOUNTER — LABORATORY RESULT (OUTPATIENT)
Age: 28
End: 2023-05-22

## 2023-05-22 ENCOUNTER — APPOINTMENT (OUTPATIENT)
Dept: ENDOCRINOLOGY | Facility: CLINIC | Age: 28
End: 2023-05-22
Payer: COMMERCIAL

## 2023-05-22 VITALS
TEMPERATURE: 98 F | HEIGHT: 65 IN | SYSTOLIC BLOOD PRESSURE: 140 MMHG | HEART RATE: 101 BPM | DIASTOLIC BLOOD PRESSURE: 80 MMHG | WEIGHT: 262 LBS | OXYGEN SATURATION: 98 % | BODY MASS INDEX: 43.65 KG/M2

## 2023-05-22 VITALS — SYSTOLIC BLOOD PRESSURE: 134 MMHG | DIASTOLIC BLOOD PRESSURE: 80 MMHG

## 2023-05-22 DIAGNOSIS — C73 MALIGNANT NEOPLASM OF THYROID GLAND: ICD-10-CM

## 2023-05-22 DIAGNOSIS — E66.9 OBESITY, UNSPECIFIED: ICD-10-CM

## 2023-05-22 PROCEDURE — 99214 OFFICE O/P EST MOD 30 MIN: CPT | Mod: 25

## 2023-05-22 PROCEDURE — 36415 COLL VENOUS BLD VENIPUNCTURE: CPT

## 2023-05-22 RX ORDER — SEMAGLUTIDE 1 MG/.5ML
1 INJECTION, SOLUTION SUBCUTANEOUS
Qty: 3 | Refills: 1 | Status: ACTIVE | COMMUNITY
Start: 2023-02-21 | End: 1900-01-01

## 2023-05-23 LAB
ALBUMIN SERPL ELPH-MCNC: 4.5 G/DL
ALP BLD-CCNC: 67 U/L
ALT SERPL-CCNC: 9 U/L
ANION GAP SERPL CALC-SCNC: 13 MMOL/L
AST SERPL-CCNC: 15 U/L
BILIRUB SERPL-MCNC: 0.2 MG/DL
BUN SERPL-MCNC: 8 MG/DL
CALCIUM SERPL-MCNC: 9.4 MG/DL
CHLORIDE SERPL-SCNC: 105 MMOL/L
CO2 SERPL-SCNC: 24 MMOL/L
CREAT SERPL-MCNC: 0.64 MG/DL
EGFR: 123 ML/MIN/1.73M2
GLUCOSE SERPL-MCNC: 82 MG/DL
POTASSIUM SERPL-SCNC: 4.1 MMOL/L
PROT SERPL-MCNC: 7.5 G/DL
SODIUM SERPL-SCNC: 142 MMOL/L
T3FREE SERPL-MCNC: 3.35 PG/ML
T4 FREE SERPL-MCNC: 2 NG/DL
THYROGLOB AB SERPL-ACNC: <20 IU/ML
THYROGLOB SERPL-MCNC: <0.2 NG/ML
TSH SERPL-ACNC: 0.01 UIU/ML

## 2023-05-25 ENCOUNTER — TRANSCRIPTION ENCOUNTER (OUTPATIENT)
Age: 28
End: 2023-05-25

## 2023-05-25 DIAGNOSIS — E03.9 HYPOTHYROIDISM, UNSPECIFIED: ICD-10-CM

## 2023-05-25 RX ORDER — LEVOTHYROXINE SODIUM 0.17 MG/1
175 TABLET ORAL DAILY
Qty: 90 | Refills: 3 | Status: ACTIVE | COMMUNITY
Start: 2023-05-25 | End: 1900-01-01

## 2023-05-26 ENCOUNTER — NON-APPOINTMENT (OUTPATIENT)
Age: 28
End: 2023-05-26

## 2023-05-29 NOTE — PHYSICAL EXAM
[Alert] : alert [Well Nourished] : well nourished [Obese] : obese [No Acute Distress] : no acute distress [Well Developed] : well developed [Normal Sclera/Conjunctiva] : normal sclera/conjunctiva [EOMI] : extra ocular movement intact [No Proptosis] : no proptosis [Normal Oropharynx] : the oropharynx was normal [Thyroid Not Enlarged] : the thyroid was not enlarged [No Thyroid Nodules] : no palpable thyroid nodules [No Respiratory Distress] : no respiratory distress [No Accessory Muscle Use] : no accessory muscle use [Clear to Auscultation] : lungs were clear to auscultation bilaterally [Normal S1, S2] : normal S1 and S2 [Normal Rate] : heart rate was normal [No Edema] : no peripheral edema [Regular Rhythm] : with a regular rhythm [Pedal Pulses Normal] : the pedal pulses are present [Normal Bowel Sounds] : normal bowel sounds [Not Tender] : non-tender [Not Distended] : not distended [Soft] : abdomen soft [Normal Anterior Cervical Nodes] : no anterior cervical lymphadenopathy [Normal Posterior Cervical Nodes] : no posterior cervical lymphadenopathy [No Spinal Tenderness] : no spinal tenderness [Spine Straight] : spine straight [No Stigmata of Cushings Syndrome] : no stigmata of Cushings Syndrome [Normal Gait] : normal gait [Normal Strength/Tone] : muscle strength and tone were normal [No Rash] : no rash [Normal Reflexes] : deep tendon reflexes were 2+ and symmetric [No Tremors] : no tremors [Oriented x3] : oriented to person, place, and time [Acanthosis Nigricans] : no acanthosis nigricans

## 2023-05-29 NOTE — HISTORY OF PRESENT ILLNESS
[FreeTextEntry1] : Ms. WARREN is a 28 year old female who presents for follow up with regards to a history of Papillary thyroid cancer s/p total thyroidectomy in May 2017 with no MUNIZ needed. Previously was following with Dr. Sandra Schmidt. Additional MHx Hypothyroid, Obesity. Was advised to reduce LT4 to 188 mcg daily in the setting of decreased TSH, however pt. deferred. On Wegovy, which was increased to 0.5 mg wkly since 3/2023. Pt. reports tolerating, denies v/d/c. Intermittent nausea that is tolerable.\par \par Last thyroid US August 2022 unremarkable. \par 2/21/2023 TSH 0.04, FT4 1.6.\par 8/16/2022 TBG <0.2.\par \par She is currently taking Synthroid 200 mcg and cytomel 5 mcg QD. \par \par She notes that after thyroidectomy in 2017, she lost 50 lbs. She notes that since she has struggled to lose any further weight despite consistent physical activity. Walks about 1 mile daily and too exercising using the elliptical on the weekends. Diet is reasonable, not overloaded in carbs. Does not eat junk food. \par \par She states that her weight was in the 200s as a teenager. Current weight: 262 lbs, down from 277 lbs at last visit.\par \par Menses are regular.\par \par Additional medical history includes that of migraines \par Meds: Lunesta\par \par Taking magnesium for migraines which she has noted significant improvement.\par \par SHx: Is a

## 2023-08-23 ENCOUNTER — APPOINTMENT (OUTPATIENT)
Dept: ENDOCRINOLOGY | Facility: CLINIC | Age: 28
End: 2023-08-23

## 2024-05-11 ENCOUNTER — OUTPATIENT (OUTPATIENT)
Dept: OUTPATIENT SERVICES | Facility: HOSPITAL | Age: 29
LOS: 1 days | End: 2024-05-11
Payer: COMMERCIAL

## 2024-05-11 ENCOUNTER — APPOINTMENT (OUTPATIENT)
Dept: ULTRASOUND IMAGING | Facility: IMAGING CENTER | Age: 29
End: 2024-05-11
Payer: COMMERCIAL

## 2024-05-11 DIAGNOSIS — C73 MALIGNANT NEOPLASM OF THYROID GLAND: ICD-10-CM

## 2024-05-11 DIAGNOSIS — Z00.8 ENCOUNTER FOR OTHER GENERAL EXAMINATION: ICD-10-CM

## 2024-05-11 PROCEDURE — 76536 US EXAM OF HEAD AND NECK: CPT

## 2024-05-11 PROCEDURE — 76536 US EXAM OF HEAD AND NECK: CPT | Mod: 26

## 2024-05-13 ENCOUNTER — NON-APPOINTMENT (OUTPATIENT)
Age: 29
End: 2024-05-13

## 2024-06-04 ENCOUNTER — APPOINTMENT (OUTPATIENT)
Dept: SURGERY | Facility: CLINIC | Age: 29
End: 2024-06-04